# Patient Record
Sex: FEMALE | Race: WHITE | Employment: OTHER | URBAN - METROPOLITAN AREA
[De-identification: names, ages, dates, MRNs, and addresses within clinical notes are randomized per-mention and may not be internally consistent; named-entity substitution may affect disease eponyms.]

---

## 2022-08-01 LAB
CALCULI COMPOSITION: NORMAL
MASS: 86 MG
STONE DESCRIPTION: NORMAL

## 2022-08-03 LAB
CALCULI COMPOSITION: NORMAL
MASS: NORMAL
STONE DESCRIPTION: NORMAL

## 2022-11-04 ENCOUNTER — ANESTHESIA EVENT (OUTPATIENT)
Dept: ENDOSCOPY | Age: 57
End: 2022-11-04
Payer: COMMERCIAL

## 2022-11-04 NOTE — PROGRESS NOTES
ENDOSCOPY PREOP INSTRUCTIONS      You are scheduled for a procedure at [facility] [employer] on 11-7 @ 800. You will need to arrival by: 630 (at least an hour & a half prior to planned start time)  Report to the MAIN entrance on [address] and register at the surgery center on the left-hand side of the lobby  You will need your insurance card and photo id    For your procedure:     PLEASE FOLLOW ALL INSTRUCTIONS & PREPS GIVEN TO YOU BY YOUR DOCTOR'S OFFICE. If you have not received these instructions yet, please call the office immediately. Make sure to read them as soon as received. Bring an accurate list of any medications, including the dose/ frequency, with you on the day of the procedure. Make sure to include over the counter medications. If you are taking blood thinners, Aspirin or diabetic medication, make sure to call your doctor as soon as possible for instructions prior to your procedure. Please dress comfortably and do not wear any lotion, powders or jewelry  Arrange for someone to be with you and sign you out & drive you home after your procedure. THIS PERSON MUST WAIT AT HOSPITAL THE ENTIRE TIME. We allow 2 adults visitors with you in the hospital & everyone is required to wear a mask.     WOMEN ONLY OF CHILDBEARING AGE: Please make sure to be able to give a urine sample on arrival      If you have further questions, you may contact your Endoscopist's office or Pre Admission Testing staff at [phone]

## 2022-11-07 ENCOUNTER — ANESTHESIA (OUTPATIENT)
Dept: ENDOSCOPY | Age: 57
End: 2022-11-07
Payer: COMMERCIAL

## 2022-11-07 ENCOUNTER — HOSPITAL ENCOUNTER (OUTPATIENT)
Age: 57
Setting detail: OUTPATIENT SURGERY
Discharge: HOME | End: 2022-11-07
Attending: INTERNAL MEDICINE | Admitting: INTERNAL MEDICINE
Payer: COMMERCIAL

## 2022-11-07 VITALS
HEIGHT: 70 IN | DIASTOLIC BLOOD PRESSURE: 99 MMHG | SYSTOLIC BLOOD PRESSURE: 123 MMHG | TEMPERATURE: 97 F | BODY MASS INDEX: 30.21 KG/M2 | WEIGHT: 211 LBS | RESPIRATION RATE: 18 BRPM | HEART RATE: 75 BPM | OXYGEN SATURATION: 98 %

## 2022-11-07 DIAGNOSIS — K21.9 GASTROESOPHAGEAL REFLUX DISEASE, UNSPECIFIED WHETHER ESOPHAGITIS PRESENT: ICD-10-CM

## 2022-11-07 DIAGNOSIS — K52.9 CHRONIC DIARRHEA: ICD-10-CM

## 2022-11-07 LAB
GLUCOSE BLD-MCNC: 146 MG/DL (ref 70–99)
PERFORMED ON: ABNORMAL

## 2022-11-07 PROCEDURE — 3700000000 HC ANESTHESIA ATTENDED CARE: Performed by: INTERNAL MEDICINE

## 2022-11-07 PROCEDURE — 88305 TISSUE EXAM BY PATHOLOGIST: CPT

## 2022-11-07 PROCEDURE — 7100000011 HC PHASE II RECOVERY - ADDTL 15 MIN: Performed by: INTERNAL MEDICINE

## 2022-11-07 PROCEDURE — 3700000001 HC ADD 15 MINUTES (ANESTHESIA): Performed by: INTERNAL MEDICINE

## 2022-11-07 PROCEDURE — 2709999900 HC NON-CHARGEABLE SUPPLY: Performed by: INTERNAL MEDICINE

## 2022-11-07 PROCEDURE — 3609012400 HC EGD TRANSORAL BIOPSY SINGLE/MULTIPLE: Performed by: INTERNAL MEDICINE

## 2022-11-07 PROCEDURE — 3609010300 HC COLONOSCOPY W/BIOPSY SINGLE/MULTIPLE: Performed by: INTERNAL MEDICINE

## 2022-11-07 PROCEDURE — 6360000002 HC RX W HCPCS: Performed by: NURSE ANESTHETIST, CERTIFIED REGISTERED

## 2022-11-07 PROCEDURE — 7100000010 HC PHASE II RECOVERY - FIRST 15 MIN: Performed by: INTERNAL MEDICINE

## 2022-11-07 PROCEDURE — 2580000003 HC RX 258: Performed by: FAMILY MEDICINE

## 2022-11-07 RX ORDER — LIDOCAINE HYDROCHLORIDE 20 MG/ML
INJECTION, SOLUTION INTRAVENOUS PRN
Status: DISCONTINUED | OUTPATIENT
Start: 2022-11-07 | End: 2022-11-07 | Stop reason: SDUPTHER

## 2022-11-07 RX ORDER — PROPOFOL 10 MG/ML
INJECTION, EMULSION INTRAVENOUS CONTINUOUS PRN
Status: DISCONTINUED | OUTPATIENT
Start: 2022-11-07 | End: 2022-11-07 | Stop reason: SDUPTHER

## 2022-11-07 RX ORDER — GABAPENTIN 300 MG/1
300 CAPSULE ORAL 3 TIMES DAILY
COMMUNITY

## 2022-11-07 RX ORDER — SODIUM CHLORIDE 9 MG/ML
INJECTION, SOLUTION INTRAVENOUS PRN
Status: DISCONTINUED | OUTPATIENT
Start: 2022-11-07 | End: 2022-11-07 | Stop reason: HOSPADM

## 2022-11-07 RX ORDER — TAMSULOSIN HYDROCHLORIDE 0.4 MG/1
0.4 CAPSULE ORAL DAILY
COMMUNITY

## 2022-11-07 RX ORDER — SODIUM CHLORIDE 0.9 % (FLUSH) 0.9 %
5-40 SYRINGE (ML) INJECTION PRN
Status: DISCONTINUED | OUTPATIENT
Start: 2022-11-07 | End: 2022-11-07 | Stop reason: HOSPADM

## 2022-11-07 RX ORDER — SODIUM CHLORIDE 0.9 % (FLUSH) 0.9 %
5-40 SYRINGE (ML) INJECTION EVERY 12 HOURS SCHEDULED
Status: DISCONTINUED | OUTPATIENT
Start: 2022-11-07 | End: 2022-11-07 | Stop reason: HOSPADM

## 2022-11-07 RX ORDER — FLUCONAZOLE 150 MG/1
150 TABLET ORAL ONCE
COMMUNITY

## 2022-11-07 RX ORDER — FINASTERIDE 5 MG/1
5 TABLET, FILM COATED ORAL DAILY
COMMUNITY

## 2022-11-07 RX ORDER — SODIUM CHLORIDE, SODIUM LACTATE, POTASSIUM CHLORIDE, CALCIUM CHLORIDE 600; 310; 30; 20 MG/100ML; MG/100ML; MG/100ML; MG/100ML
INJECTION, SOLUTION INTRAVENOUS CONTINUOUS
Status: DISCONTINUED | OUTPATIENT
Start: 2022-11-07 | End: 2022-11-07 | Stop reason: HOSPADM

## 2022-11-07 RX ORDER — APREMILAST 30 MG/1
TABLET, FILM COATED ORAL
COMMUNITY

## 2022-11-07 RX ADMIN — SODIUM CHLORIDE, POTASSIUM CHLORIDE, SODIUM LACTATE AND CALCIUM CHLORIDE: 600; 310; 30; 20 INJECTION, SOLUTION INTRAVENOUS at 07:13

## 2022-11-07 RX ADMIN — PROPOFOL 261.23 MCG/KG/MIN: 10 INJECTION, EMULSION INTRAVENOUS at 08:10

## 2022-11-07 RX ADMIN — LIDOCAINE HYDROCHLORIDE 100 MG: 20 INJECTION, SOLUTION INTRAVENOUS at 08:10

## 2022-11-07 ASSESSMENT — PAIN SCALES - GENERAL
PAINLEVEL_OUTOF10: 0
PAINLEVEL_OUTOF10: 0

## 2022-11-07 ASSESSMENT — PAIN - FUNCTIONAL ASSESSMENT: PAIN_FUNCTIONAL_ASSESSMENT: 0-10

## 2022-11-07 NOTE — OP NOTE
Endoscopy Note    Patient: Jayleen Butcher  : 1965  CSN: [id number]    Procedure: Esophagogastroduodenoscopy    Date:  2022     Surgeon:  Mary Olmedo MD     Referring Physician:  Debbie Fernandez MD    Preoperative Diagnosis:  Chronic diarrhea [K52.9]  Gastroesophageal reflux disease, unspecified whether esophagitis present [K21.9]    Anesthesia:  MAC      Description of Procedure:  Informed consent was obtained from the patient after explanation of indications, benefits and possible risks and complications of the procedure. The patient was then taken to the endoscopy suite, placed in the left lateral decubitus position and the above IV sedation was administrered. The Olympus video endoscope was passed through the hypopharynx into the esophagus. The scope was advanced all the way to the third portion of the duodenum. The scope was slowly withdrawn and mucosa was carefully evaluated including a retroflex view of the proximal stomach. Findings and interventions are described below. The patient was decompressed and the scope was then withdrawn. Gastric or Duodenal ulcer present: No    The patient tolerated the procedure well and was taken to the post anesthesia care unit in good condition. There were no immediate complications. Impression:  -Normal upper endoscopy, with no endoscopic evidence of neoplasia or mucosal abnormality. Small hiatal hernia noted. Recommendations: -Continue acid suppression. , -Await pathology. Colonoscopy Procedure Note      Colonoscopy with biopsy    Procedure Details:    After informed consent was obtained with all risks and benefits of procedure explained and preoperative exam completed, the patient was taken to the endoscopy suite and placed in the left lateral decubitus position.  Upon sequential sedation as per above, a digital rectal exam was performed and was normal.  The Olympus videocolonoscope  was inserted in the rectum and carefully advanced to the terminal ileum. Cecum Intubated Yes. The quality of preparation was adequate. The colonoscope was slowly withdrawn with careful evaluation between folds. Retroflexion in the rectum was performed. Estimated Blood Loss: None    Complications:  none    Findings:   Normal terminal ileum  normal colonic mucosa throughout -random colonic biopsies obtained to rule out microscopic colitis  diverticulosis,  Minimal in degree, involving the sigmoid  hemorrhoids external, Moderate in size    Plan: Await pathology results. Next screening colonoscopy in 10 years.         Signed By: Carrie Dukes MD

## 2022-11-07 NOTE — ANESTHESIA PRE PROCEDURE
Department of Anesthesiology  Preprocedure Note       Name:  Nicko Fulton   Age:  61 y.o.  :  1965                                          MRN:  [id number]         Date:  2022      Surgeon: Jacqueline Reddy):  Violeta Bhagat MD    Procedure: Procedure(s):  ESOPHAGOGASTRODUODENOSCOPY  COLONOSCOPY    Medications prior to admission:   Prior to Admission medications    Medication Sig Start Date End Date Taking? Authorizing Provider   metFORMIN (GLUCOPHAGE) 1000 MG tablet TAKE ONE TABLET BY MOUTH TWICE A DAY 4/2/15   Mirella Rincon MD   hydrochlorothiazide (HYDRODIURIL) 25 MG tablet Take 1 tablet by mouth daily. 2/12/15   Mirella Rincon MD   Magnesium 400 MG CAPS Take  by mouth. Historical Provider, MD   ALPRAZolam Shady Kaplan) 0.5 MG tablet Take one tablet twice daily for anxiety. 1/8/15   Mirella Rincon MD   montelukast (SINGULAIR) 10 MG tablet TAKE ONE TABLET BY MOUTH EVERY DAY 1/8/15   Mirella Rincon MD   losartan (COZAAR) 100 MG tablet TAKE 1 TABLET BY MOUTH DAILY. 1/8/15   Mirella Rincon MD   APAP-Isometheptene-Dichloral (NODOLOR) 325- MG CAPS Take 1-2 tablets by mouth 3 times daily as needed (headache). 1/8/15   Mirella Rincon MD   ONE TOUCH ULTRA TEST strip TEST 2 TO 4 TIMES PER DAY 14   Mirella Rincon MD   insulin glargine (LANTUS) 100 UNIT/ML injection vial INJECT 70 UNITS SUBCUTANEOUSLY EVERY NIGHT AT BEDTIME 14   Mirella Rincon MD   Insulin Pen Needle (BD PEN NEEDLE SATINDER U/F) 32G X 4 MM MISC As directed 13   Mirella Rincon MD   PROAIR  (77 BASE) MCG/ACT inhaler INHALE 2 PUFFS EVERY 4 HOURS AS NEEDED 13   Mirella Rincon MD   B-D INS SYR ULTRAFINE 1CC/30G 30G X 1/2\" 1 ML MISC USE ONCE DAILY 6/15/13   Mirella Rincon MD   Blood Glucose Monitoring Suppl (ONE TOUCH ULTRA) by Does not apply route. Historical Provider, MD   aspirin 81 MG EC tablet Take 81 mg by mouth daily.     Historical Provider, MD       Current medications:    No current facility-administered medications for this encounter. Allergies: Allergies   Allergen Reactions    Ace Inhibitors     Azithromycin     Ciprofloxacin      Bad yeast infection    Codeine      nausea    Diclofenac      hematuria    Percocet [Oxycodone-Acetaminophen]      Nausea      Morphine Nausea And Vomiting       Problem List:    Patient Active Problem List   Diagnosis Code    Depression F32. A    Peripheral neuropathy G62.9    Nephrolithiasis N20.0    Hypertension I10    Type 2 diabetes mellitus ([facility] [address] E11.9    Asthma J45.909       Past Medical History:        Diagnosis Date    Asthma     Hypertension     Nephrolithiasis     Peripheral Neuropathy     [facility]        Past Surgical History:        Procedure Laterality Date    KID[place] STONE SURGERY  2011    [place]    OVARY SURGERY         Social History:    Social History     Tobacco Use    Smoking status: Never    Smokeless tobacco: Never   Substance Use Topics    Alcohol use: No                                Counseling given: Not Answered      Vital Signs (Current):   Vitals:    11/07/22 0658   BP: 129/85   Pulse: 87   Resp: 16   Temp: 97 °F (36.1 °C)   TempSrc: Temporal   SpO2: 98%   Weight: 211 lb (95.7 kg)   Height: 5' 9.5\" (1.765 m)                                              BP Readings from Last 3 Encounters:   11/07/22 129/85   01/08/15 (!) 140/96   06/19/14 124/82       NPO Status:                                                                                 BMI:   Wt Readings from Last 3 Encounters:   11/07/22 211 lb (95.7 kg)   01/08/15 256 lb (116.1 kg)   06/19/14 253 lb (114.8 kg)     Body mass index is 30.71 kg/m².     CBC: No results found for: WBC, RBC, HGB, HCT, MCV, RDW, PLT    CMP:   Lab Results   Component Value Date/Time     01/09/2015 08:52 AM    K 3.8 01/09/2015 08:52 AM     01/09/2015 08:52 AM    CO2 27 01/09/2015 08:52 AM    BUN 12 01/09/2015 08:52 AM    CREATININE 0.50 01/09/2015 08:52 AM    GFRAA >60 06/20/2013 04:31 PM    GFRAA >60 06/20/2013 04:31 PM    AGRATIO 1.5 01/09/2015 08:52 AM    LABGLOM 113 01/09/2015 08:52 AM    GLUCOSE 186 01/09/2015 08:52 AM    PROT 6.7 01/09/2015 08:52 AM    PROT 6.4 09/20/2012 08:54 AM    CALCIUM 8.9 01/09/2015 08:52 AM    BILITOT 0.6 01/09/2015 08:52 AM    ALKPHOS 113 01/09/2015 08:52 AM    AST 15 01/09/2015 08:52 AM    ALT 29 01/09/2015 08:52 AM       POC Tests: No results for input(s): POCGLU, POCNA, POCK, POCCL, POCBUN, POCHEMO, POCHCT in the last 72 hours. Coags: No results found for: PROTIME, INR, APTT    HCG (If Applicable): No results found for: PREGTESTUR, PREGSERUM, HCG, HCGQUANT     ABGs: No results found for: PHART, PO2ART, JAK5TRH, LWA5HFQ, BEART, W8SUPIRV     Type & Screen (If Applicable):  No results found for: LABABO, LABRH    Drug/Infectious Status (If Applicable):  No results found for: HIV, HEPCAB    COVID-19 Screening (If Applicable): No results found for: COVID19        Anesthesia Evaluation  Patient summary reviewed and Nursing notes reviewed no history of anesthetic complications:   Airway: Mallampati: II     Neck ROM: full  Mouth opening: > = 3 FB   Dental: normal exam         Pulmonary:normal exam    (+) asthma:                            Cardiovascular:    (+) hypertension:,                   Neuro/Psych:   (+) neuromuscular disease:, psychiatric history:            GI/Hepatic/Renal: Neg GI/Hepatic/Renal ROS            Endo/Other:    (+) DiabetesType II DM, , .                 Abdominal:             Vascular: negative vascular ROS. Other Findings:           Anesthesia Plan      MAC     ASA 3       Induction: intravenous. Anesthetic plan and risks discussed with patient. Plan discussed with CRNA.     Attending anesthesiologist reviewed and agrees with Preprocedure content                Addie Giraldo MD   11/7/2022

## 2022-11-07 NOTE — DISCHARGE INSTRUCTIONS
ENDOSCOPY DISCHARGE INSTRUCTIONS:    Call the physician that did your procedure for any questions or concern:    [facility]: [phone]  DR. KHLOE DE LA FUENTE          ACTIVITY:    There are potential side effects to the medications used for sedation and anesthesia during your procedure. These include:  Dizziness or light-headedness, confusion or memory loss, delayed reaction times, loss of coordination, nausea and vomiting. Because of your increased risk for injury, we ask that you observe the following precautions: For the next 24 hours,  DO NOT operate an automobile, bicycle, motorcycle, , power tools or large equipment of any kind. Do not drink alcohol, sign any legal documents or make any legal decisions for 24 hours. Do not bend your head over lower than your heart. DO sit on the side of bed/couch awhile before getting up. Plan on bedrest or quiet relaxation today. You may resume normal activities in 24 hours. DIET:    Your first meal today should be light, avoiding spicy and fatty foods. If you tolerate this first meal, then you may advance to your regular diet unless otherwise advised by your physician. NORMAL SYMPTOMS:  -Mild sore throat if youve had an EGD   -Gaseous discomfort    NOTIFY YOUR PHYSICIAN IF THESE SYMPTOMS OCCUR:  1. Fever (greater than 100)  5. Increased abdominal bloating  2. Severe pain    6. Excessive bleeding  3. Nausea and vomiting  7. Chest pain                                                                    4. Chills    8. Shortness of breath    ADDITIONAL INSTRUCTIONS:    Biopsy results: Call [address],[facility] for biopsy results in 1 week    Educational Information:    Findings:   Normal terminal ileum  normal colonic mucosa throughout -random colonic biopsies obtained to rule out microscopic colitis  diverticulosis,  Minimal in degree, involving the sigmoid  hemorrhoids external, Moderate in size     Plan: Await pathology results.   Next screening colonoscopy in 10 years. Please review these discharge instructions this evening or tomorrow for  information you may have forgotten. We want to thank you for choosing the [facility] as your health care provider. We always strive to provide you with excellent care while you are here. You may receive a survey in the mail regarding your care. We would appreciate you taking a few minutes of your time to complete this survey.

## 2022-11-07 NOTE — PROGRESS NOTES
Ambulatory Surgery/Procedure Discharge Note    Vitals:    11/07/22 0856   BP: (!) 123/99   Pulse: 75   Resp: 18   Temp: 97 °F (36.1 °C)   SpO2: 98%       In: 350 [P.O.:350]  Out: -     Restroom use offered before discharge. Yes    Pain assessment:  none  Pain Level: 0    No distress noted, Denies pain and or any needs. D/C instructions given friend at bedside both deny any questions and/or concerns regarding d/c. D/c paperwork given to pt. Patient discharged to home/self care.  Patient discharged via wheel chair by transporter to waiting family/S.O.       11/7/2022 8:57 AM

## 2022-11-07 NOTE — ANESTHESIA POSTPROCEDURE EVALUATION
Department of Anesthesiology  Postprocedure Note    Patient: Deborah Michele  MRN: [id number]  YOB: 1965  Date of evaluation: 11/7/2022      Procedure Summary     Date: 11/07/22 Room / Location: [facility]    Anesthesia Start: 7151 Anesthesia Stop: 7272    Procedures:       EGD BIOPSY      COLONOSCOPY WITH BIOPSY Diagnosis:       Chronic diarrhea      Gastroesophageal reflux disease, unspecified whether esophagitis present      (Chronic diarrhea [K52.9])      (Gastroesophageal reflux disease, unspecified whether esophagitis present [K21.9])    Surgeons: Juan Antonio Dial MD Responsible Provider: Latoya Vázquez MD    Anesthesia Type: MAC ASA Status: 3          Anesthesia Type: No value filed.     Vaishlai Phase I: Vaishali Score: 10    Vaishali Phase II: Vaishali Score: 10      Anesthesia Post Evaluation    Patient location during evaluation: PACU  Patient participation: complete - patient participated  Level of consciousness: awake and alert  Airway patency: patent  Nausea & Vomiting: no nausea and no vomiting  Complications: no  Cardiovascular status: hemodynamically stable  Respiratory status: acceptable  Hydration status: euvolemic  Multimodal analgesia pain management approach

## 2022-11-07 NOTE — H&P
[facility] ENDOSCOPY  Outpatient Procedure H&P    Patient: Tanya Farfan MRN: [id number]     YOB: 1965  Age: 61 y.o. Sex: female    Unit: [facility] ENDOSCOPY Room/Bed: Endo Pool/NONE Location: [address] [place] [address]     Procedure: Procedure(s):  ESOPHAGOGASTRODUODENOSCOPY  COLONOSCOPY    Indication: Chronic diarrhea [K52.9]  Gastroesophageal reflux disease, unspecified whether esophagitis present [K21.9]    Referring  Physician:          Nurses past medical history notes reviewed and agreed. Medications reviewed.     Allergies: Ace inhibitors, Azithromycin, Ciprofloxacin, Codeine, Diclofenac, Percocet [oxycodone-acetaminophen], and Morphine     Allergies noted: Yes     Past Medical History:   Past Medical History:   Diagnosis Date    Asthma     Hypertension     Nephrolithiasis     Peripheral Neuropathy     Type 2 diabetes mellitus (HCC)        Past Surgical History:   Past Surgical History:   Procedure Laterality Date    KIDNEY STONE SURGERY  2011    [place]    OVARY SURGERY         Social History:   Social History     Socioeconomic History    Marital status:      Spouse name: Not on file    Number of children: Not on file    Years of education: Not on file    Highest education level: Not on file   Occupational History    Not on file   Tobacco Use    Smoking status: Never    Smokeless tobacco: Never   Substance and Sexual Activity    Alcohol use: No    Drug use: No    Sexual activity: Not on file   Other Topics Concern    Not on file   Social History Narrative    Not on file     Social Determinants of Health     Financial Resource Strain: Not on file   Food Insecurity: Not on file   Transportation Needs: Not on file   Physical Activity: Not on file   Stress: Not on file   Social Connections: Not on file   Intimate Partner Violence: Not on file   Housing Stability: Not on file       Family History:   Family History   Problem Relation Age of Onset    Diabetes Father     Diabetes Brother        Home Medications:   Prior to Admission medications    Medication Sig Start Date End Date Taking? Authorizing Provider   POTASSIUM CHLORIDE ER PO Take by mouth   Yes Historical Provider, MD   gabapentin (NEURONTIN) 300 MG capsule Take 300 mg by mouth 3 times daily. Yes Historical Provider, MD   Apremilast (OTEZLA) 30 MG TABS Take by mouth   Yes Historical Provider, MD   tamsulosin (FLOMAX) 0.4 MG capsule Take 0.4 mg by mouth daily   Yes Historical Provider, MD   fluconazole (DIFLUCAN) 150 MG tablet Take 150 mg by mouth once   Yes Historical Provider, MD   finasteride (PROSCAR) 5 MG tablet Take 5 mg by mouth daily   Yes Historical Provider, MD   metFORMIN (GLUCOPHAGE) 1000 MG tablet TAKE ONE TABLET BY MOUTH TWICE A DAY 4/2/15   Miley Eng MD   hydrochlorothiazide (HYDRODIURIL) 25 MG tablet Take 1 tablet by mouth daily. 2/12/15   Miley Eng MD   ALPRAZolam Pat Barks) 0.5 MG tablet Take one tablet twice daily for anxiety. 1/8/15   Miley Eng MD   montelukast (SINGULAIR) 10 MG tablet TAKE ONE TABLET BY MOUTH EVERY DAY 1/8/15   Miley Eng MD   ONE TOUCH ULTRA TEST strip TEST 2 TO 4 TIMES PER DAY 6/23/14   Miley Eng MD   insulin glargine (LANTUS) 100 UNIT/ML injection vial INJECT 70 UNITS SUBCUTANEOUSLY EVERY NIGHT AT BEDTIME  Patient taking differently: 74 Units INJECT 70 UNITS SUBCUTANEOUSLY EVERY NIGHT AT BEDTIME 6/19/14   Miley Eng MD   Insulin Pen Needle (BD PEN NEEDLE SATINDER U/F) 32G X 4 MM MISC As directed 11/21/13   Miley Eng MD   PROAIR  (21 BASE) MCG/ACT inhaler INHALE 2 PUFFS EVERY 4 HOURS AS NEEDED 11/18/13   Miley Eng MD   B-D INS SYR ULTRAFINE 1CC/30G 30G X 1/2\" 1 ML MISC USE ONCE DAILY 6/15/13   Miley Eng MD   Blood Glucose Monitoring Suppl (ONE TOUCH ULTRA) by Does not apply route.     Historical Provider, MD       Review of Systems:  Weight Loss: No  Dysphagia: No  Dyspepsia: No  Melena: no  Chest pain: no    Physical Exam:   Vital Signs: /85   Pulse 87   Temp 97 °F (36.1 °C) (Temporal)   Resp 16   Ht 5' 9.5\" (1.765 m)   Wt 211 lb (95.7 kg)   LMP 11/12/2013   SpO2 98%   BMI 30.71 kg/m²   Vital signs reviewed:Yes    HEENT:Normal  Cardiac:Normal  Chest:Normal  Abdomen:Normal  Exts: Normal  Neuro:Normal    Labs:  Admission on 11/07/2022   Component Date Value Ref Range Status    POC Glucose 11/07/2022 146 (A)  70 - 99 mg/dl Final    Performed on 11/07/2022 ACCU-CHEK   Final        Imaging:  No orders to display       ASA:2    Mallampati Score: II    Sedation planned: MAC    Patient in acceptable condition for procedure: Yes    8:09 AM 11/7/2022    Lamberto Timmons MD      Please note that some or all of this record was generated using voice recognition software. If there are any questions about the content of this document, please contact the author as some errors in transcription may have occurred.

## 2022-12-21 ENCOUNTER — HOSPITAL ENCOUNTER (OUTPATIENT)
Dept: NUCLEAR MEDICINE | Age: 57
Discharge: HOME | End: 2022-12-21
Payer: COMMERCIAL

## 2022-12-21 DIAGNOSIS — R11.0 NAUSEA: ICD-10-CM

## 2022-12-21 PROCEDURE — 3430000000 HC RX DIAGNOSTIC RADIOPHARMACEUTICAL: Performed by: INTERNAL MEDICINE

## 2022-12-21 PROCEDURE — 78264 GASTRIC EMPTYING IMG STUDY: CPT

## 2022-12-21 PROCEDURE — A9541 TC99M SULFUR COLLOID: HCPCS | Performed by: INTERNAL MEDICINE

## 2022-12-21 RX ADMIN — Medication 0.1 MILLICURIE: at 07:59

## (undated) DEVICE — CANNULA SAMP CO2 AD GRN 7FT CO2 AND 7FT O2 TBNG UNIV CONN

## (undated) DEVICE — FORCEPS BX L240CM JAW DIA2.8MM L CAP W/ NDL MIC MESH TOOTH